# Patient Record
Sex: MALE
[De-identification: names, ages, dates, MRNs, and addresses within clinical notes are randomized per-mention and may not be internally consistent; named-entity substitution may affect disease eponyms.]

---

## 2019-09-23 ENCOUNTER — HOSPITAL ENCOUNTER (EMERGENCY)
Dept: HOSPITAL 41 - JD.ED | Age: 59
Discharge: HOME | End: 2019-09-23
Payer: MEDICAID
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
## 2019-09-23 DIAGNOSIS — T63.304A: Primary | ICD-10-CM

## 2019-09-23 PROCEDURE — 99281 EMR DPT VST MAYX REQ PHY/QHP: CPT
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
